# Patient Record
Sex: FEMALE | Race: BLACK OR AFRICAN AMERICAN | Employment: UNEMPLOYED | URBAN - METROPOLITAN AREA
[De-identification: names, ages, dates, MRNs, and addresses within clinical notes are randomized per-mention and may not be internally consistent; named-entity substitution may affect disease eponyms.]

---

## 2018-02-02 ENCOUNTER — HOSPITAL ENCOUNTER (EMERGENCY)
Age: 28
Discharge: HOME | End: 2018-02-02
Attending: OBSTETRICS & GYNECOLOGY | Admitting: OBSTETRICS & GYNECOLOGY
Payer: MEDICAID

## 2018-02-02 VITALS
HEIGHT: 63 IN | TEMPERATURE: 97.5 F | WEIGHT: 153 LBS | SYSTOLIC BLOOD PRESSURE: 101 MMHG | BODY MASS INDEX: 27.11 KG/M2 | DIASTOLIC BLOOD PRESSURE: 58 MMHG

## 2018-02-02 PROBLEM — O42.90 ROM (RUPTURE OF MEMBRANES), PREMATURE: Status: ACTIVE | Noted: 2018-02-02

## 2018-02-02 LAB
A1 MICROGLOB PLACENTAL VAG QL: NEGATIVE
APPEARANCE UR: CLEAR
BILIRUB UR QL: NEGATIVE
COLOR UR: YELLOW
CONTROL LINE PRESENT?: YES
EXPIRATION DATE: NORMAL
GLUCOSE UR QL STRIP.AUTO: NEGATIVE MG/DL
INTERNAL NEGATIVE CONTROL: NEGATIVE
KETONES UR-MCNC: NEGATIVE MG/DL
KIT LOT NO.: NORMAL
LEUKOCYTE ESTERASE UR QL STRIP: ABNORMAL
NITRITE UR QL: NEGATIVE
PH UR: 6 [PH] (ref 5–9)
PROT UR QL: NEGATIVE MG/DL
RBC # UR STRIP: NEGATIVE /UL
SERVICE CMNT-IMP: ABNORMAL
SP GR UR: 1.01 (ref 1–1.02)
UROBILINOGEN UR QL: 0.2 EU/DL (ref 0.2–1)

## 2018-02-02 PROCEDURE — 84112 EVAL AMNIOTIC FLUID PROTEIN: CPT | Performed by: OBSTETRICS & GYNECOLOGY

## 2018-02-02 PROCEDURE — 77030008477 HC STYL SATN SLP COVD -A

## 2018-02-02 PROCEDURE — 59025 FETAL NON-STRESS TEST: CPT

## 2018-02-02 PROCEDURE — 99283 EMERGENCY DEPT VISIT LOW MDM: CPT

## 2018-02-02 PROCEDURE — 81003 URINALYSIS AUTO W/O SCOPE: CPT

## 2018-02-02 NOTE — IP AVS SNAPSHOT
[address] Patient: Dionna Escalona MRN: [id number] PGT:4/52/6001 About your hospitalization You were admitted on:  N/A You last received care in the:  [facility] [id number] [facility] You were discharged on:  February 2, 2018 Why you were hospitalized Your primary diagnosis was:  Not on File Your diagnoses also included:  Rom (Rupture Of Membranes), Premature Follow-up Information Follow up With Details Comments Contact Info Emiliana Other, MD   Patient can only remember the practice name and not the physician Discharge Orders None A check slava indicates which time of day the medication should be taken. My Medications ASK your doctor about these medications Instructions Each Dose to Equal  
 Morning Noon Evening Bedtime PNV66-Iron Fumarate-FA-DSS-DHA 26-1.2- mg Cap Your last dose was: Your next dose is: Take 1 Tab by mouth. Indications: pregnancy 1 Tab Discharge Instructions Prenatal Discharge Instructions Follow up appointment: Ensure to keep your scheduled appointment on Monday 2/5/2018 with your OB Diet: As tolerated ensure to drink plenty of fluids especially water to remain hydrated Activity: As tolerated ensure to elevate your feet while sitting to help keep swelling to a minimum Medications: As prescribed Call your physician or return to Labor and Delivery if any of the following symptoms occur: ? Signs of labor (contractions every 5-10 minutes for 1 hour) ? Vaginal bleeding ? Rupture of amniotic membranes (water breaks) ? Fever ? Decreased fetal movement (less than 5-10 movements in 1 hour) Introducing [facility]!    
 Amaris Steward introduces [employer] patient portal. Now you can access parts of your medical record, email your doctor's office, and request medication refills online. 1. In your internet browser, go to [email or web address] 2. Click on the First Time User? Click Here link in the Sign In box. You will see the New Member Sign Up page. 3. Enter your [employer] Access Code exactly as it appears below. You will not need to use this code after youve completed the sign-up process. If you do not sign up before the expiration date, you must request a new code. · [employer] Access Code: [email or web address] Expires: 4/9/2018 10:36 PM 
 
4. Enter the last four digits of your Social Security Number (xxxx) and Date of Birth (mm/dd/yyyy) as indicated and click Submit. You will be taken to the next sign-up page. 5. Create a [employer] ID. This will be your [employer] login ID and cannot be changed, so think of one that is secure and easy to remember. 6. Create a [employer] password. You can change your password at any time. 7. Enter your Password Reset Question and Answer. This can be used at a later time if you forget your password. 8. Enter your e-mail address. You will receive e-mail notification when new information is available in [address] 9. Click Sign Up. You can now view and download portions of your medical record. 10. Click the Download Summary menu link to download a portable copy of your medical information. If you have questions, please visit the Frequently Asked Questions section of the [employer] website. Remember, [employer] is NOT to be used for urgent needs. For medical emergencies, dial 911. Now available from your iPhone and Android! Providers Seen During Your Hospitalization Provider Specialty Primary office phone Sandie Huitron MD Obstetrics & Gynecology [phone] Your Primary Care Physician (PCP) Primary Care Physician Office Phone Office Fax OTHER, PHYS ** None ** ** None ** You are allergic to the following No active allergies Recent Documentation Height Weight BMI OB Status Smoking Status 1.6 m 69.4 kg 27.1 kg/m2 Pregnant Former Smoker Emergency Contacts Name Discharge Info Relation Home Work Mobile Anjana Denny  Parent [1] [phone] Patient Belongings The following personal items are in your possession at time of discharge: 
                             
 
  
  
 Please provide this summary of care documentation to your next provider. Signatures-by signing, you are acknowledging that this After Visit Summary has been reviewed with you and you have received a copy. Patient Signature:  ____________________________________________________________ Date:  ____________________________________________________________  
  
[facility] Provider Signature:  ____________________________________________________________ Date:  ____________________________________________________________

## 2018-02-02 NOTE — PROGRESS NOTES
presents from work for [employer] @ [phone] patient reports clear fluid denies bleeding reports pain 7/10 positive fetal movement noted per nurse TOCO and EFM applied.     1704 cervix closed patient Actprom neg  sve done pernieum dry and intact    1735 verbal bedside shift report done with  Danitza Martin RN report  Done from [facility]

## 2018-02-02 NOTE — PROGRESS NOTES
0683- Patient resting in bed, ice chips given     1819- Patient off monitor, up to restroom     1935- Dr. Sue Suarez at [address] [place] [address] exam, nitazine negtive, SVE- closed     2010- Discharge instructions given to patient, patient verbalized understanding

## 2018-02-02 NOTE — IP AVS SNAPSHOT
Summary of Care Report The Summary of Care report has been created to help improve care coordination. Users with access to [employer] or [employer] [address] (Web-based application) may access additional patient information including the Discharge Summary. If you are not currently a [address] user and need more information, please call the number listed below in the [place] [address] section and ask to be connected with Medical Records. Facility Information Name Address Phone [address] [facility]  [address] [facility] [address] [phone] Patient Information Patient Name Sex  Charles Montenegro ([id number]) Female 1990 Discharge Information Admitting Provider Service Area Unit Keturah Zaman MD / [address] Labor & Delivery / [phone] Discharge Provider Discharge Date/Time Discharge Disposition Destination (none) 2018 (Pending) AHR (none) Patient Language Language ENGLISH [13] Hospital Problems as of 2018  Never Reviewed Class Noted - Resolved Last Modified POA Active Problems ROM (rupture of membranes), premature  2018 - Present 2018 by Keturah Zaman MD Unknown Entered by Keturah Zaman MD  
  
You are allergic to the following No active allergies Current Discharge Medication List  
  
ASK your doctor about these medications Dose & Instructions Dispensing Information Comments PNV66-Iron Fumarate-FA-DSS-DHA 26-1.2- mg Cap Dose:  1 Tab Take 1 Tab by mouth. Indications: pregnancy Refills:  0 Follow-up Information Follow up With Details Comments Contact Info Emiliana Martinez MD   Patient can only remember the practice name and not the physician Discharge Instructions Prenatal Discharge Instructions Follow up appointment: Ensure to keep your scheduled appointment on Monday 2/5/2018 with your OB Diet: As tolerated ensure to drink plenty of fluids especially water to remain hydrated Activity: As tolerated ensure to elevate your feet while sitting to help keep swelling to a minimum Medications: As prescribed Call your physician or return to Labor and Delivery if any of the following symptoms occur: ? Signs of labor (contractions every 5-10 minutes for 1 hour) ? Vaginal bleeding ? Rupture of amniotic membranes (water breaks) ? Fever ? Decreased fetal movement (less than 5-10 movements in 1 hour) Chart Review Routing History No Routing History on File

## 2018-02-03 NOTE — H&P
History & Physical    Name: Tyler Cheung MRN: [id number]  SSN: [id number]    YOB: 1990  Age: 33 y.o. Sex: female        Subjective:     Estimated Date of Delivery: 3/12/18  OB History      Para Term  AB Living    5 2 2 0 2 2    SAB TAB Ectopic Molar Multiple Live Births    0 2 0 0 0 2          Ms. Gina Bartlett presents for evaluation of pregnancy at 34w4d for possible ROM, was at work, used the bathroom, went back to the office and felt a gush of fluid that soaked her panties and pants. Left work, thinks she has continued leak small amounts. Prenatal course was complicated by poor weight gain, only gained 6 lbs this pregnancy, sees [facility], has been having growths, baby doing well. Please see prenatal records for details. History reviewed. No pertinent past medical history. History reviewed. No pertinent surgical history. No Known Allergies  Prior to Admission medications    Medication Sig Start Date End Date Taking? Authorizing Provider   PNV66-Iron Fumarate-FA-DSS-DHA 26-1.2- mg cap Take 1 Tab by mouth. Indications: pregnancy   Yes Historical Provider      Social History     Occupational History    Not on file. Social History Main Topics    Smoking status: Former Smoker     Packs/day: 0.25    Smokeless tobacco: Never Used    Alcohol use Yes      Comment: socially    Drug use: No    Sexual activity: Yes     Partners: Male     Family History   Problem Relation Age of Onset    Hypertension Mother     Hypertension Father     Hypertension Maternal Grandmother     Diabetes Maternal Grandmother     Hypertension Maternal Grandfather     Diabetes Maternal Grandfather     Hypertension Paternal Grandmother     Diabetes Paternal Grandmother     Hypertension Paternal Grandfather     Diabetes Paternal Grandfather        Review of Systems: A comprehensive review of systems was negative.     Objective:     Vitals:  Vitals:    18 1700 18 1704 02/02/18 1705   BP:   101/58   Temp: 97.5 °F (36.4 °C)  97.5 °F (36.4 °C)   Weight:  69.4 kg (153 lb)    Height:  5' 3\" (1.6 m)         Physical Exam:  GENERAL:  Well developed, well nourished, alert, oriented x 3, in no distress  HEENT: Normal cephalic, atraumatic, good dentition, neck supple.  No adenopathy or thyromegaly  CV: regular rate and rhythm  LUNGS: clear, good air entry, no wheezes, rhonchi or rales  ABDOMEN: gravid, without tenderness, guarding, rebound or masses  EXTREMITIES: no edema or erythema noted  NEURO: Grossly intact     PELVIC EXAM:  LABIA MAJORA: no masses, tenderness or lesions  LABIA MINORA: no masses, tenderness or lesions  CLITORIS: no masses, tenderness or lesions  VAGINA: pink appearing vagina with physiologic discharge, no lesions, no pooling, nit neg   PERINEUM: no masses, tenderness or lesions  CERVIX: closed/thick/high  UTERUS: gravid, nontender  ADNEXA: nontender and no masses    Membranes:  Intact  Fetal Heart Rate: Baseline: 135 per minute  Variability: moderate  Accelerations: yes  Decelerations: none  Uterine contractions: irregular    Prenatal Labs:   Lab Results   Component Value Date/Time    Rubella, External immune 07/14/2017    HBsAg, External negative 07/14/2017    HIV, External nonreactive 07/14/2017    RPR, External nonreactive 07/14/2017    Gonorrhea, External negative 07/14/2017    Chlamydia, External negative 10/04/2017          Recent Results (from the past 24 hour(s))   POC URINE MACROSCOPIC    Collection Time: 02/02/18  6:42 PM   Result Value Ref Range    Color YELLOW      Appearance CLEAR      Spec. gravity (POC) 1.010 1.001 - 1.023      pH, urine  (POC) 6.0 5.0 - 9.0      Protein (POC) NEGATIVE  NEG mg/dL    Glucose, urine (POC) NEGATIVE  NEG mg/dL    Ketones (POC) NEGATIVE  NEG mg/dL    Bilirubin (POC) NEGATIVE  NEG      Blood (POC) NEGATIVE  NEG      Urobilinogen (POC) 0.2 0.2 - 1.0 EU/dL    Nitrite (POC) NEGATIVE  NEG      Leukocyte esterase (POC) SMALL (A) NEG      Performed by Michoacano Higginbotham        Assessment/Plan:     Patient Active Problem List   Diagnosis Code    ROM (rupture of membranes), premature O42.90       Plan: 34w4d   Suspected ROM--> No evidence of labor, not ruptured   Poor weight gain--> Notes baby's growths are fine   Reassuring fetal status   D/C home   F/u as scheduled with primary OB    Signed By:  Sheila Neely MD     February 2, 2018 7:38 PM

## 2018-02-03 NOTE — DISCHARGE INSTRUCTIONS
Prenatal Discharge Instructions  Follow up appointment: Ensure to keep your scheduled appointment on Monday 2/5/2018 with your OB    Diet: As tolerated ensure to drink plenty of fluids especially water to remain hydrated     Activity: As tolerated ensure to elevate your feet while sitting to help keep swelling to a minimum     Medications:  As prescribed     Call your physician or return to Labor and Delivery if any of the following symptoms occur:   Signs of labor (contractions every 5-10 minutes for 1 hour)   Vaginal bleeding   Rupture of amniotic membranes (water breaks)   Fever   Decreased fetal movement (less than 5-10 movements in 1 hour)

## 2018-03-06 PROBLEM — O42.90 ROM (RUPTURE OF MEMBRANES), PREMATURE: Status: RESOLVED | Noted: 2018-02-02 | Resolved: 2018-03-06

## 2018-03-06 PROBLEM — Z34.90 TERM PREGNANCY, REPEAT: Status: ACTIVE | Noted: 2018-03-06

## 2018-03-06 PROBLEM — Z34.90 TERM PREGNANCY: Chronic | Status: ACTIVE | Noted: 2018-03-06

## 2019-01-15 PROBLEM — Z34.90 TERM PREGNANCY: Chronic | Status: RESOLVED | Noted: 2018-03-06 | Resolved: 2019-01-15

## 2019-09-12 ENCOUNTER — HOSPITAL ENCOUNTER (EMERGENCY)
Age: 29
Discharge: HOME | End: 2019-09-12
Attending: EMERGENCY MEDICINE
Payer: MEDICAID

## 2019-09-12 ENCOUNTER — HOSPITAL ENCOUNTER (OUTPATIENT)
Dept: ULTRASOUND IMAGING | Age: 29
Discharge: HOME | End: 2019-09-12
Attending: PHYSICIAN ASSISTANT
Payer: MEDICAID

## 2019-09-12 VITALS
TEMPERATURE: 97.9 F | RESPIRATION RATE: 18 BRPM | SYSTOLIC BLOOD PRESSURE: 90 MMHG | OXYGEN SATURATION: 100 % | DIASTOLIC BLOOD PRESSURE: 57 MMHG | WEIGHT: 145 LBS | BODY MASS INDEX: 24.89 KG/M2 | HEART RATE: 68 BPM

## 2019-09-12 DIAGNOSIS — Z3A.17 17 WEEKS GESTATION OF PREGNANCY: Primary | ICD-10-CM

## 2019-09-12 LAB
ABO + RH BLD: NORMAL
ALBUMIN SERPL-MCNC: 3.3 G/DL (ref 3.4–5)
ALBUMIN/GLOB SERPL: 0.8 {RATIO} (ref 0.8–1.7)
ALP SERPL-CCNC: 37 U/L (ref 45–117)
ALT SERPL-CCNC: 12 U/L (ref 13–56)
ANION GAP SERPL CALC-SCNC: 4 MMOL/L (ref 3–18)
APPEARANCE UR: CLEAR
AST SERPL-CCNC: 8 U/L (ref 10–38)
BASOPHILS # BLD: 0 K/UL (ref 0–0.1)
BASOPHILS NFR BLD: 0 % (ref 0–2)
BILIRUB SERPL-MCNC: 0.6 MG/DL (ref 0.2–1)
BILIRUB UR QL: NEGATIVE
BUN SERPL-MCNC: 4 MG/DL (ref 7–18)
BUN/CREAT SERPL: 7 (ref 12–20)
CALCIUM SERPL-MCNC: 8.8 MG/DL (ref 8.5–10.1)
CHLORIDE SERPL-SCNC: 103 MMOL/L (ref 100–111)
CO2 SERPL-SCNC: 26 MMOL/L (ref 21–32)
COLOR UR: YELLOW
CREAT SERPL-MCNC: 0.55 MG/DL (ref 0.6–1.3)
DIFFERENTIAL METHOD BLD: ABNORMAL
EOSINOPHIL # BLD: 0.1 K/UL (ref 0–0.4)
EOSINOPHIL NFR BLD: 2 % (ref 0–5)
ERYTHROCYTE [DISTWIDTH] IN BLOOD BY AUTOMATED COUNT: 13.2 % (ref 11.6–14.5)
GLOBULIN SER CALC-MCNC: 4.2 G/DL (ref 2–4)
GLUCOSE SERPL-MCNC: 74 MG/DL (ref 74–99)
GLUCOSE UR STRIP.AUTO-MCNC: NEGATIVE MG/DL
HCG UR QL: POSITIVE
HCT VFR BLD AUTO: 26.4 % (ref 35–45)
HGB BLD-MCNC: 9 G/DL (ref 12–16)
HGB UR QL STRIP: NEGATIVE
KETONES UR QL STRIP.AUTO: NEGATIVE MG/DL
LEUKOCYTE ESTERASE UR QL STRIP.AUTO: NEGATIVE
LYMPHOCYTES # BLD: 2.3 K/UL (ref 0.9–3.6)
LYMPHOCYTES NFR BLD: 47 % (ref 21–52)
MCH RBC QN AUTO: 27.5 PG (ref 24–34)
MCHC RBC AUTO-ENTMCNC: 34.1 G/DL (ref 31–37)
MCV RBC AUTO: 80.7 FL (ref 74–97)
MONOCYTES # BLD: 0.2 K/UL (ref 0.05–1.2)
MONOCYTES NFR BLD: 4 % (ref 3–10)
NEUTS SEG # BLD: 2.3 K/UL (ref 1.8–8)
NEUTS SEG NFR BLD: 47 % (ref 40–73)
NITRITE UR QL STRIP.AUTO: NEGATIVE
PH UR STRIP: 7 [PH] (ref 5–8)
PLATELET # BLD AUTO: 203 K/UL (ref 135–420)
PMV BLD AUTO: 8.9 FL (ref 9.2–11.8)
POTASSIUM SERPL-SCNC: 3.7 MMOL/L (ref 3.5–5.5)
PROT SERPL-MCNC: 7.5 G/DL (ref 6.4–8.2)
PROT UR STRIP-MCNC: NEGATIVE MG/DL
RBC # BLD AUTO: 3.27 M/UL (ref 4.2–5.3)
SODIUM SERPL-SCNC: 133 MMOL/L (ref 136–145)
SP GR UR REFRACTOMETRY: 1.02 (ref 1–1.03)
UROBILINOGEN UR QL STRIP.AUTO: 1 EU/DL (ref 0.2–1)
WBC # BLD AUTO: 4.8 K/UL (ref 4.6–13.2)

## 2019-09-12 PROCEDURE — 85025 COMPLETE CBC W/AUTO DIFF WBC: CPT

## 2019-09-12 PROCEDURE — 76815 OB US LIMITED FETUS(S): CPT

## 2019-09-12 PROCEDURE — 81025 URINE PREGNANCY TEST: CPT

## 2019-09-12 PROCEDURE — 96374 THER/PROPH/DIAG INJ IV PUSH: CPT

## 2019-09-12 PROCEDURE — 96361 HYDRATE IV INFUSION ADD-ON: CPT

## 2019-09-12 PROCEDURE — 99284 EMERGENCY DEPT VISIT MOD MDM: CPT

## 2019-09-12 PROCEDURE — 74011250636 HC RX REV CODE- 250/636: Performed by: PHYSICIAN ASSISTANT

## 2019-09-12 PROCEDURE — 96375 TX/PRO/DX INJ NEW DRUG ADDON: CPT

## 2019-09-12 PROCEDURE — 86900 BLOOD TYPING SEROLOGIC ABO: CPT

## 2019-09-12 PROCEDURE — 81003 URINALYSIS AUTO W/O SCOPE: CPT

## 2019-09-12 PROCEDURE — 80053 COMPREHEN METABOLIC PANEL: CPT

## 2019-09-12 RX ORDER — SODIUM CHLORIDE 9 MG/ML
1000 INJECTION, SOLUTION INTRAVENOUS ONCE
Status: COMPLETED | OUTPATIENT
Start: 2019-09-12 | End: 2019-09-12

## 2019-09-12 RX ORDER — MORPHINE SULFATE 2 MG/ML
2 INJECTION, SOLUTION INTRAMUSCULAR; INTRAVENOUS ONCE
Status: COMPLETED | OUTPATIENT
Start: 2019-09-12 | End: 2019-09-12

## 2019-09-12 RX ORDER — ONDANSETRON 2 MG/ML
4 INJECTION INTRAMUSCULAR; INTRAVENOUS ONCE
Status: COMPLETED | OUTPATIENT
Start: 2019-09-12 | End: 2019-09-12

## 2019-09-12 RX ADMIN — SODIUM CHLORIDE 1000 ML: 900 INJECTION, SOLUTION INTRAVENOUS at 14:23

## 2019-09-12 RX ADMIN — ONDANSETRON 4 MG: 2 INJECTION INTRAMUSCULAR; INTRAVENOUS at 13:14

## 2019-09-12 RX ADMIN — SODIUM CHLORIDE 1000 ML: 900 INJECTION, SOLUTION INTRAVENOUS at 11:58

## 2019-09-12 RX ADMIN — MORPHINE SULFATE 2 MG: 2 INJECTION, SOLUTION INTRAMUSCULAR; INTRAVENOUS at 13:15

## 2019-09-12 NOTE — ED NOTES
Patient presents to ED with C/O abd cramping and a H/A that started weeks ago. The abd cramping started 3 days ago. The pt denies N/V/D, urinary symptoms, and fever/chills. Patient is A&Ox4, side rails upx1, call bell w/in reach, and aware of plan of care. The patient is in NAd.

## 2019-09-12 NOTE — DISCHARGE INSTRUCTIONS

## 2019-09-12 NOTE — ED TRIAGE NOTES
\"I've been having a lot of abdominal cramping and a real bad migraine. \" Reports symptoms started 3 days ago. LMP-approximately 05/23/2019; home pregnancy test in July. Denies any prenatal care.

## 2019-09-12 NOTE — ED PROVIDER NOTES
EMERGENCY DEPARTMENT HISTORY AND PHYSICAL EXAM    Date: 9/12/2019  Patient Name: Rhoda Sharif    History of Presenting Illness     Chief Complaint   Patient presents with    Pregnancy Problem    Headache    Abdominal Pain         History Provided By: Patient    Chief Complaint: abdominal cramping  Duration: 1 Weeks  Timing:  Intermittent  Location: low abdomen  Quality: Cramping  Severity: Mild  Modifying Factors: took tylenol w/o relief  Associated Symptoms: Headache      Additional History (Context): Rhoda Sharif is a 30 y.o. female with chlamydia, genital herpes who presents with abdominal cramping and HA x 1 week. Pt states her LMP was in April, took a home preg test in May and it was positive. Pt states she has not had any prenatal care as she has been out of town caring for a sick grandparent. Denies fever, chills, urinary complaints, vaginal discharge, vaginal bleeding or any other symptoms or concerns. PCP: None    Current Outpatient Medications   Medication Sig Dispense Refill    prenatal vit 36-ELIJ fum-folic 27 mg iron- 1 mg tab Take 1 Tab by mouth daily. 30 Tab 0    valACYclovir (VALTREX) 500 mg tablet Take  by mouth two (2) times a day.  ferrous sulfate 325 mg (65 mg iron) tablet Take 1 Tab by mouth three (3) times daily (with meals). 90 Tab 0    PNV66-Iron Fumarate-FA-DSS-DHA 26-1.2- mg cap Take 1 Tab by mouth. Indications: pregnancy         Past History     Past Medical History:  Past Medical History:   Diagnosis Date    Chickenpox      As a child    Chlamydia     Positive Chlamydia in the past rxed. Chlamydia Negative at onset of pregnancy and on 36 week labs.     Genital herpes     Pt on Valrex for prophylaxis since 36 weeks       Past Surgical History:  Past Surgical History:   Procedure Laterality Date    HX DILATION AND CURETTAGE      s/p TAB x2 2010 and 2011       Family History:  Family History   Problem Relation Age of Onset    Hypertension Mother    [facility] Hypertension Father     Hypertension Maternal Grandmother     Diabetes Maternal Grandmother     Hypertension Maternal Grandfather     Diabetes Maternal Grandfather     Hypertension Paternal Grandmother     Diabetes Paternal Grandmother     Hypertension Paternal Grandfather     Diabetes Paternal Grandfather        Social History:  Social History     Tobacco Use    Smoking status: Former Smoker     Packs/day: 0.25    Smokeless tobacco: Never Used   Substance Use Topics    Alcohol use: No     Frequency: Never     Comment: socially    Drug use: No       Allergies:  No Known Allergies      Review of Systems   Review of Systems   Constitutional: Negative for chills and fever. Gastrointestinal: Positive for abdominal pain. Neurological: Positive for headaches. All other systems reviewed and are negative. All Other Systems Negative  Physical Exam     Vitals:    09/12/19 1112 09/12/19 1158 09/12/19 1447   BP: (!) 87/62 99/67 90/57   Pulse: 90 75 68   Resp: 18     Temp: 97.9 °F (36.6 °C)     SpO2: 100% 100% 100%   Weight: 65.8 kg (145 lb)       Physical Exam   Constitutional: She appears well-developed and well-nourished. No distress. HENT:   Head: Normocephalic and atraumatic. Right Ear: External ear normal.   Left Ear: External ear normal.   Mouth/Throat: Oropharynx is clear and moist.   Eyes: Conjunctivae are normal.   Neck: Normal range of motion. Neck supple. Cardiovascular: Normal rate. Pulmonary/Chest: Effort normal.   Abdominal: Soft. Bowel sounds are normal. She exhibits no distension. There is no tenderness. There is no rebound and no guarding. Musculoskeletal: Normal range of motion. Neurological: She is alert. Skin: Skin is warm and dry. She is not diaphoretic. Psychiatric: She has a normal mood and affect.             Diagnostic Study Results     Labs -     Recent Results (from the past 12 hour(s))   URINALYSIS W/ RFLX MICROSCOPIC    Collection Time: 09/12/19 11:11 AM   Result Value Ref Range    Color YELLOW      Appearance CLEAR      Specific gravity 1.021 1.005 - 1.030      pH (UA) 7.0 5.0 - 8.0      Protein NEGATIVE  NEG mg/dL    Glucose NEGATIVE  NEG mg/dL    Ketone NEGATIVE  NEG mg/dL    Bilirubin NEGATIVE  NEG      Blood NEGATIVE  NEG      Urobilinogen 1.0 0.2 - 1.0 EU/dL    Nitrites NEGATIVE  NEG      Leukocyte Esterase NEGATIVE  NEG     HCG URINE, QL    Collection Time: 09/12/19 11:11 AM   Result Value Ref Range    HCG urine, QL POSITIVE (A) NEG     CBC WITH AUTOMATED DIFF    Collection Time: 09/12/19 11:30 AM   Result Value Ref Range    WBC 4.8 4.6 - 13.2 K/uL    RBC 3.27 (L) 4.20 - 5.30 M/uL    HGB 9.0 (L) 12.0 - 16.0 g/dL    HCT 26.4 (L) 35.0 - 45.0 %    MCV 80.7 74.0 - 97.0 FL    MCH 27.5 24.0 - 34.0 PG    MCHC 34.1 31.0 - 37.0 g/dL    RDW 13.2 11.6 - 14.5 %    PLATELET 313 757 - 706 K/uL    MPV 8.9 (L) 9.2 - 11.8 FL    NEUTROPHILS 47 40 - 73 %    LYMPHOCYTES 47 21 - 52 %    MONOCYTES 4 3 - 10 %    EOSINOPHILS 2 0 - 5 %    BASOPHILS 0 0 - 2 %    ABS. NEUTROPHILS 2.3 1.8 - 8.0 K/UL    ABS. LYMPHOCYTES 2.3 0.9 - 3.6 K/UL    ABS. MONOCYTES 0.2 0.05 - 1.2 K/UL    ABS. EOSINOPHILS 0.1 0.0 - 0.4 K/UL    ABS. BASOPHILS 0.0 0.0 - 0.1 K/UL    DF AUTOMATED     BLOOD TYPE, (ABO+RH)    Collection Time: 09/12/19 11:30 AM   Result Value Ref Range    ABO/Rh(D) A POSITIVE    METABOLIC PANEL, COMPREHENSIVE    Collection Time: 09/12/19 11:30 AM   Result Value Ref Range    Sodium 133 (L) 136 - 145 mmol/L    Potassium 3.7 3.5 - 5.5 mmol/L    Chloride 103 100 - 111 mmol/L    CO2 26 21 - 32 mmol/L    Anion gap 4 3.0 - 18 mmol/L    Glucose 74 74 - 99 mg/dL    BUN 4 (L) 7.0 - 18 MG/DL    Creatinine 0.55 (L) 0.6 - 1.3 MG/DL    BUN/Creatinine ratio 7 (L) 12 - 20      GFR est AA >60 >60 ml/min/1.73m2    GFR est non-AA >60 >60 ml/min/1.73m2    Calcium 8.8 8.5 - 10.1 MG/DL    Bilirubin, total 0.6 0.2 - 1.0 MG/DL    ALT (SGPT) 12 (L) 13 - 56 U/L    AST (SGOT) 8 (L) 10 - 38 U/L    Alk.  phosphatase 37 (L) 45 - 117 U/L    Protein, total 7.5 6.4 - 8.2 g/dL    Albumin 3.3 (L) 3.4 - 5.0 g/dL    Globulin 4.2 (H) 2.0 - 4.0 g/dL    A-G Ratio 0.8 0.8 - 1.7         Radiologic Studies -   US PREG [facility]   Final Result   IMPRESSION:      Viable intrauterine gestation with average ultrasound age 12 weeks, 0 days. Cardiac activity identified. CT Results  (Last 48 hours)    None        CXR Results  (Last 48 hours)    None            Medical Decision Making   I am the first provider for this patient. I reviewed the vital signs, available nursing notes, past medical history, past surgical history, family history and social history. Vital Signs-Reviewed the patient's vital signs. Pulse Oximetry Analysis - 100% on RA      Records Reviewed: Nursing Notes    Procedures:  Procedures    Provider Notes (Medical Decision Making): 29yoF c/o abd pain and HA. States she is 3-4 months pregnant, but no prenatal care. No vomiting, diarrhea, vaginal bleeding/discharge. Abd soft, non tender. Labs reassuring. US: +IUP at 17 weeks with +HR. Discussed w/ pt, pt states feeling better. Will f/u with previous OB. Tolerated PO, ready to go. DAMON Duenas 3:30 PM        MED RECONCILIATION:  No current facility-administered medications for this encounter. Current Outpatient Medications   Medication Sig    prenatal vit 76-XPVS fum-folic 27 mg iron- 1 mg tab Take 1 Tab by mouth daily.  valACYclovir (VALTREX) 500 mg tablet Take  by mouth two (2) times a day.  ferrous sulfate 325 mg (65 mg iron) tablet Take 1 Tab by mouth three (3) times daily (with meals).  PNV66-Iron Fumarate-FA-DSS-DHA 26-1.2- mg cap Take 1 Tab by mouth. Indications: pregnancy       Disposition:  home    DISCHARGE NOTE:     Pt has been reexamined. Patient has no new complaints, changes, or physical findings. Care plan outlined and precautions discussed. Results of labs and US were reviewed with the patient.  All medications were reviewed with the patient; will d/c home with prenatals. All of pt's questions and concerns were addressed. Patient was instructed and agrees to follow up with OB, as well as to return to the ED upon further deterioration. Patient is ready to go home. Follow-up Information     Follow up With Specialties Details Why Contact Info    Vandana Flaherty MD Obstetrics & Gynecology, Obstetrics, Gynecology  For follow-up [address] [place] [address]  [phone]            Current Discharge Medication List      START taking these medications    Details   prenatal vit 80-KSWW fum-folic 27 mg iron- 1 mg tab Take 1 Tab by mouth daily. Qty: 30 Tab, Refills: 0         CONTINUE these medications which have NOT CHANGED    Details   valACYclovir (VALTREX) 500 mg tablet Take  by mouth two (2) times a day. ferrous sulfate 325 mg (65 mg iron) tablet Take 1 Tab by mouth three (3) times daily (with meals). Qty: 90 Tab, Refills: 0      PNV66-Iron Fumarate-FA-DSS-DHA 26-1.2- mg cap Take 1 Tab by mouth.  Indications: pregnancy               Diagnosis     Clinical Impression:   1. 17 weeks gestation of pregnancy

## 2020-02-11 PROBLEM — O99.019 ANEMIA AFFECTING FIFTH PREGNANCY: Status: ACTIVE | Noted: 2020-02-11

## 2020-02-11 PROBLEM — O09.40 ANEMIA AFFECTING FIFTH PREGNANCY: Status: ACTIVE | Noted: 2020-02-11

## 2022-03-18 PROBLEM — Z34.90 TERM PREGNANCY, REPEAT: Status: ACTIVE | Noted: 2018-03-06

## 2022-03-19 PROBLEM — O99.019 ANEMIA AFFECTING FIFTH PREGNANCY: Status: ACTIVE | Noted: 2020-02-11

## 2022-03-19 PROBLEM — O09.40 ANEMIA AFFECTING FIFTH PREGNANCY: Status: ACTIVE | Noted: 2020-02-11

## 2024-04-01 ENCOUNTER — APPOINTMENT (OUTPATIENT)
Facility: HOSPITAL | Age: 34
End: 2024-04-01
Payer: COMMERCIAL

## 2024-04-01 ENCOUNTER — HOSPITAL ENCOUNTER (EMERGENCY)
Facility: HOSPITAL | Age: 34
Discharge: HOME | End: 2024-04-01
Attending: EMERGENCY MEDICINE
Payer: COMMERCIAL

## 2024-04-01 VITALS
DIASTOLIC BLOOD PRESSURE: 85 MMHG | RESPIRATION RATE: 17 BRPM | OXYGEN SATURATION: 100 % | BODY MASS INDEX: 25.44 KG/M2 | WEIGHT: 149 LBS | SYSTOLIC BLOOD PRESSURE: 126 MMHG | HEIGHT: 64 IN | HEART RATE: 88 BPM | TEMPERATURE: 98.3 F

## 2024-04-01 DIAGNOSIS — S39.92XA INJURY OF COCCYX, INITIAL ENCOUNTER: Primary | ICD-10-CM

## 2024-04-01 PROCEDURE — 72220 X-RAY EXAM SACRUM TAILBONE: CPT

## 2024-04-01 PROCEDURE — 99283 EMERGENCY DEPT VISIT LOW MDM: CPT

## 2024-04-01 RX ORDER — CYCLOBENZAPRINE HCL 10 MG
10 TABLET ORAL 3 TIMES DAILY PRN
Qty: 15 TABLET | Refills: 0 | Status: SHIPPED | OUTPATIENT
Start: 2024-04-01 | End: 2024-04-06

## 2024-04-01 RX ORDER — HYDROCODONE BITARTRATE AND ACETAMINOPHEN 5; 325 MG/1; MG/1
1 TABLET ORAL EVERY 4 HOURS PRN
Qty: 12 TABLET | Refills: 0 | Status: SHIPPED | OUTPATIENT
Start: 2024-04-01 | End: 2024-04-04

## 2024-04-01 ASSESSMENT — LIFESTYLE VARIABLES
HOW OFTEN DO YOU HAVE A DRINK CONTAINING ALCOHOL: MONTHLY OR LESS
HOW MANY STANDARD DRINKS CONTAINING ALCOHOL DO YOU HAVE ON A TYPICAL DAY: 1 OR 2

## 2024-04-01 ASSESSMENT — PAIN - FUNCTIONAL ASSESSMENT: PAIN_FUNCTIONAL_ASSESSMENT: 0-10

## 2024-04-01 ASSESSMENT — PAIN SCALES - GENERAL: PAINLEVEL_OUTOF10: 10

## 2024-04-02 NOTE — ED PROVIDER NOTES
Normal range of motion.      Comments: Mid upper buttocks/lower back     Skin:     Coloration: Skin is not pale.   Neurological:      General: No focal deficit present.      Mental Status: She is alert.      Sensory: No sensory deficit.      Motor: No weakness.   Psychiatric:         Mood and Affect: Mood normal.         DIAGNOSTIC RESULTS     EKG: All EKG's are interpreted by the Emergency Department Physician who either signs or Co-signs this chart in the absence of a cardiologist.      RADIOLOGY:   Non-plain film images such as CT, Ultrasound and MRI are read by the radiologist. Plain radiographic images are visualized and preliminarily interpreted by the emergency physician with the below findings:      Interpretation per the Radiologist below, if available at the time of this note:    XR SACRUM COCCYX (MIN 2 VIEWS)   Final Result      1. Negative study            LABS:  Labs Reviewed - No data to display    All other labs were within normal range or not returned as of this dictation.    EMERGENCY DEPARTMENT COURSE and DIFFERENTIAL DIAGNOSIS/MDM:   Vitals:    Vitals:    04/01/24 2105   BP: 126/85   Pulse: 88   Resp: 17   Temp: 98.3 °F (36.8 °C)   TempSrc: Oral   SpO2: 100%   Weight: 67.6 kg (149 lb)   Height: 1.626 m (5' 4\")         Discussion:       No emc. Not c/w cord compression/cauda equina/fracture/epidural abscess/acute abdomen/dvt/aaa. Stable for discharge and close follow-up.  Patient agrees w discharge plan and verbalizes understanding of detailed return inst given. No indication for urgent mri or other imaging.           FINAL IMPRESSION      1. Injury of coccyx, initial encounter          DISPOSITION/PLAN   DISPOSITION Decision To Discharge 04/01/2024 10:13:03 PM      PATIENT REFERRED TO:  [facility] EMERGENCY DEPT  [address] [id number] [phone]  Go to   As needed, If symptoms worsen    [facility]  [address]

## 2024-04-02 NOTE — ED TRIAGE NOTES
Ambulatory pt c/o tailbone pain described as sharp x 2 days s/p falling on rocks while playing with kids. Pain exacerbated with movement and palpitation

## 2024-04-02 NOTE — ED NOTES
C/o increased coccyx pain after falling yesterday. Works from home and sits for most of the day. Came in to be evaluated for the increased pain. Took ibuprofen x 2 since the incident., last dose at 8pm Sunday. Did not use any other treatment today.